# Patient Record
Sex: FEMALE | Race: BLACK OR AFRICAN AMERICAN | Employment: FULL TIME | URBAN - METROPOLITAN AREA
[De-identification: names, ages, dates, MRNs, and addresses within clinical notes are randomized per-mention and may not be internally consistent; named-entity substitution may affect disease eponyms.]

---

## 2022-05-10 ENCOUNTER — APPOINTMENT (OUTPATIENT)
Dept: GENERAL RADIOLOGY | Age: 27
End: 2022-05-10
Payer: MEDICAID

## 2022-05-10 ENCOUNTER — HOSPITAL ENCOUNTER (EMERGENCY)
Age: 27
Discharge: HOME | End: 2022-05-10
Attending: EMERGENCY MEDICINE
Payer: MEDICAID

## 2022-05-10 VITALS
SYSTOLIC BLOOD PRESSURE: 130 MMHG | HEART RATE: 92 BPM | DIASTOLIC BLOOD PRESSURE: 70 MMHG | OXYGEN SATURATION: 98 % | TEMPERATURE: 98.2 F | HEIGHT: 62 IN | BODY MASS INDEX: 24.29 KG/M2 | WEIGHT: 132 LBS | RESPIRATION RATE: 18 BRPM

## 2022-05-10 DIAGNOSIS — J40 BRONCHITIS: Primary | ICD-10-CM

## 2022-05-10 LAB
FLU A ANTIGEN: NEGATIVE
FLU B ANTIGEN: NEGATIVE
SARS-COV-2: NORMAL
SARS-COV-2: NOT DETECTED
SOURCE: NORMAL

## 2022-05-10 PROCEDURE — U0005 INFEC AGEN DETEC AMPLI PROBE: HCPCS

## 2022-05-10 PROCEDURE — 87804 INFLUENZA ASSAY W/OPTIC: CPT

## 2022-05-10 PROCEDURE — U0003 INFECTIOUS AGENT DETECTION BY NUCLEIC ACID (DNA OR RNA); SEVERE ACUTE RESPIRATORY SYNDROME CORONAVIRUS 2 (SARS-COV-2) (CORONAVIRUS DISEASE [COVID-19]), AMPLIFIED PROBE TECHNIQUE, MAKING USE OF HIGH THROUGHPUT TECHNOLOGIES AS DESCRIBED BY CMS-2020-01-R: HCPCS

## 2022-05-10 PROCEDURE — 71045 X-RAY EXAM CHEST 1 VIEW: CPT

## 2022-05-10 PROCEDURE — 99284 EMERGENCY DEPT VISIT MOD MDM: CPT

## 2022-05-10 RX ORDER — ALBUTEROL SULFATE 90 UG/1
2 AEROSOL, METERED RESPIRATORY (INHALATION) EVERY 6 HOURS PRN
Qty: 18 G | Refills: 3 | Status: SHIPPED | OUTPATIENT
Start: 2022-05-10 | End: 2022-05-30

## 2022-05-10 RX ORDER — PREDNISONE 10 MG/1
TABLET ORAL
Qty: 20 TABLET | Refills: 0 | Status: SHIPPED | OUTPATIENT
Start: 2022-05-10 | End: 2022-05-20

## 2022-05-10 RX ORDER — ALBUTEROL SULFATE 90 UG/1
2 AEROSOL, METERED RESPIRATORY (INHALATION) EVERY 6 HOURS PRN
COMMUNITY
End: 2022-05-30

## 2022-05-10 RX ORDER — BENZONATATE 100 MG/1
100 CAPSULE ORAL 3 TIMES DAILY PRN
Qty: 30 CAPSULE | Refills: 0 | Status: SHIPPED | OUTPATIENT
Start: 2022-05-10 | End: 2022-05-17

## 2022-05-10 ASSESSMENT — PAIN - FUNCTIONAL ASSESSMENT
PAIN_FUNCTIONAL_ASSESSMENT: NONE - DENIES PAIN
PAIN_FUNCTIONAL_ASSESSMENT: PREVENTS OR INTERFERES SOME ACTIVE ACTIVITIES AND ADLS
PAIN_FUNCTIONAL_ASSESSMENT: 0-10

## 2022-05-10 ASSESSMENT — PAIN DESCRIPTION - PAIN TYPE: TYPE: ACUTE PAIN

## 2022-05-10 ASSESSMENT — ENCOUNTER SYMPTOMS
SORE THROAT: 0
COUGH: 1
ABDOMINAL PAIN: 0
WHEEZING: 0
SHORTNESS OF BREATH: 0
DIARRHEA: 0

## 2022-05-10 ASSESSMENT — PAIN DESCRIPTION - ONSET: ONSET: GRADUAL

## 2022-05-10 ASSESSMENT — PAIN SCALES - GENERAL: PAINLEVEL_OUTOF10: 8

## 2022-05-10 ASSESSMENT — PAIN DESCRIPTION - FREQUENCY: FREQUENCY: INTERMITTENT

## 2022-05-10 ASSESSMENT — PAIN DESCRIPTION - LOCATION: LOCATION: CHEST

## 2022-05-10 ASSESSMENT — PAIN DESCRIPTION - DESCRIPTORS: DESCRIPTORS: SHARP

## 2022-05-10 NOTE — LETTER
[id number] [facility] ED  [address] [place] [address]  Phone: [phone]  Fax: [phone]               May 10, 2022    Patient: Kenedll Browne   YOB: 1995   Date of Visit: 5/10/2022       To Whom It May Concern:    Irish Gray was seen and treated in our emergency department on 5/10/2022. She may return to work 5/12/2022.        Sincerely,       Registered Nurse        Signature:__________________________________

## 2022-05-10 NOTE — ED NOTES
AVS reviewed with the patient. Discussed follow-up care, taking medications as prescribed, and when to call 911. VSS. All questions answered. Prescriptions printed and provided to patient. Ambulatory out of the department with a steady, unassisted gait.        Keila Alba RN  05/10/22 7251

## 2022-05-10 NOTE — ED PROVIDER NOTES
[id number] [facility] ED  [address] [facility] [address]  Phone: [phone]  Fax: [phone]        Pt Name: Emmanuel Vieira  MRN: [id number]  [place] 1995  Date of evaluation: 5/10/22      CHIEF COMPLAINT     Chief Complaint   Patient presents with    Cough    Fever         HISTORY OF PRESENT ILLNESS  (Location/Symptom, Timing/Onset, Context/Setting, Quality, Duration, Modifying Factors, Severity.)    Emmanuel Vieira is a 23 y.o. female who presents with chills cough congestion. The patient dates on Friday she started developing congestion cough that is occasionally productive of sputum some subjective fever chills no loss of smell or taste not knowingly exposed to anybody with similar symptoms no family members with similar symptoms nothing she does makes her symptoms better or worse no shortness of breath the patient dates she has had some occasional post cough emesis otherwise tolerating by mouth intake      REVIEW OF SYSTEMS    (2-9 systems for level 4, 10 or more for level 5)     Review of Systems   Constitutional: Positive for chills and fever. HENT: Positive for congestion. Negative for ear pain and sore throat. Respiratory: Positive for cough. Negative for shortness of breath and wheezing. Gastrointestinal: Negative for abdominal pain and diarrhea. PAST MEDICAL HISTORY    has a past medical history of Asthma and Thyroid disease. SURGICAL HISTORY      has no past surgical history on file. CURRENTMEDICATIONS       Previous Medications    ALBUTEROL SULFATE HFA (VENTOLIN HFA) 108 (90 BASE) MCG/ACT INHALER    Inhale 2 puffs into the lungs every 6 hours as needed for Wheezing       ALLERGIES     has no allergies on file. FAMILY HISTORY     has no family status information on file. Family history is unknown by patient. SOCIAL HISTORY      reports that she has never smoked. She has never used smokeless tobacco. She reports previous alcohol use.  She reports that she does not use drugs. PHYSICAL EXAM    (up to 7 for level 4, 8 or more for level 5)   INITIAL VITALS:  height is 5' 2\" (1.575 m) and weight is 59.9 kg (132 lb). Her oral temperature is 98.2 °F (36.8 °C). Her blood pressure is 132/74 and her pulse is 94. Her respiration is 18 and oxygen saturation is 99%. Physical Exam  Vitals and nursing note reviewed. Constitutional:       Appearance: Normal appearance. HENT:      Head: Normocephalic and atraumatic. Right Ear: Tympanic membrane normal.      Left Ear: Tympanic membrane normal.      Mouth/Throat:      Mouth: Mucous membranes are moist.      Pharynx: Oropharynx is clear. Eyes:      Conjunctiva/sclera: Conjunctivae normal.   Cardiovascular:      Rate and Rhythm: Normal rate and regular rhythm. Pulses: Normal pulses. Heart sounds: Normal heart sounds. Pulmonary:      Effort: Pulmonary effort is normal. No respiratory distress. Breath sounds: Normal breath sounds. No stridor. No wheezing, rhonchi or rales. Abdominal:      General: There is no distension. Palpations: Abdomen is soft. Tenderness: There is no abdominal tenderness. There is no guarding. Musculoskeletal:         General: No swelling or tenderness. Normal range of motion. Cervical back: Normal range of motion and neck supple. No rigidity or tenderness. Lymphadenopathy:      Cervical: No cervical adenopathy. Skin:     General: Skin is warm and dry. Findings: No rash. Neurological:      General: No focal deficit present. Mental Status: She is alert.          DIFFERENTIAL DIAGNOSIS/ MDM:     I will get a chest x-ray as well as check for influenza and COVID    DIAGNOSTIC RESULTS         RADIOLOGY:        Interpretation per the Radiologist below, if available at the time of this note:    XR CHEST PORTABLE (Final result)  Result time 05/10/22 07:52:49  Final result by Sylvie Lockhart MD (05/10/22 07:52:49)                Impression: No radiographic evidence of an acute cardiopulmonary process. Narrative:    EXAMINATION:   ONE XRAY VIEW OF THE CHEST     5/10/2022 7:40 am     COMPARISON:   None. HISTORY:   ORDERING SYSTEM PROVIDED HISTORY: cough   TECHNOLOGIST PROVIDED HISTORY:   cough   Reason for Exam: Pt state she has had upper left chest pain, cough and some   SOB for several days.  Ap upr on cart     FINDINGS:   Cardiomediastinal silhouette appears within normal limits.  No focal   consolidation or overt pulmonary edema.  Costophrenic angles are sharp.  No   evidence of pneumothorax.  No acute osseous abnormalities. LABS:  Results for orders placed or performed during the hospital encounter of 05/10/22   Rapid influenza A/B antigens    Specimen: Nares   Result Value Ref Range    Flu A Antigen NEGATIVE NEGATIVE    Flu B Antigen NEGATIVE NEGATIVE           EMERGENCY DEPARTMENT COURSE:   Vitals:    Vitals:    05/10/22 0659 05/10/22 0700   BP:  132/74   Pulse: 94    Resp: 18    Temp: 98.2 °F (36.8 °C)    TempSrc: Oral    SpO2: 99%    Weight: 59.9 kg (132 lb)    Height: 5' 2\" (1.575 m)      -------------------------  BP: 132/74, Temp: 98.2 °F (36.8 °C), Pulse: 94, Resp: 18      RE-EVALUATION:  X-ray shows no acute process the patient is not tachycardic she is not hypoxic she has been tolerating by mouth intake given this I do feel able to be followed up as an outpatient I will write prescriptions for prednisone albuterol and Tessalon Perles  The patient presents with viral symptoms that may represent COVID-19. However at this time, all vital signs are stable and the patient is not hypoxic or tachypneic. Presenting  symptoms are currently mild and not life, limb or organ threatening. The patient presents with upper respiratory symptoms that are not suggestive in nature of pulmonary embolus, cardiac ischemia, meningitis, epiglottis, airway obstruction or other serious etiology.  Given the extremely low risk of these diagnoses further testing and evaluation for these possibilites are not indicated at this time. In my opinion, the patient currently does not have an emergency medical condition as defined by EMTALA. I have stressed to the patient that while their current symptoms may not be severe to them, they are highly contagious and should self quarantine for at least 10 days from the onset of symptoms or 72 hours after resolution of their fever without any antipyretic medications (whichever is shorter). Isolation strategies have been discussed and reinforced to protect their friends and families. I have answered questions asked to the patients satisfaction. They have been instructed to immediately return to the emergency department if symptoms worsen including but not limited to shortness of breath, chest pain, a feeling of passing out,light headed or dizziness, any neurologic symptoms, abdominal pain, or persistent nausea, vomiting and/or diarrhea. I have stressed the importance of close followup with their primary care physicians via telemedicine. The patient has voiced understanding of these instructions and does not offer any further questions or complaints. The patient understands that at this time there is no evidence for a more malignant underlying process, but the patient also understands that early in the process of an illness or injury, an emergency department workup can be falsely reassuring. Routine discharge counseling was given, and the patient understands that worsening, changing or persistent symptoms should prompt an immediate call or follow up with their primary physician or return to the emergency department. The importance of appropriate follow up was also discussed. I have reviewed the disposition diagnosis with the patient and or their family/guardian. I have answered their questions and given discharge instructions.   They voiced understanding of these instructions and did not have any further questions or complaints. PROCEDURES:  None    FINAL IMPRESSION      1. Bronchitis          DISPOSITION/PLAN   DISPOSITION Decision To Discharge 05/10/2022 08:01:08 AM      CONDITION ON DISPOSITION:   Stable    PATIENT REFERRED TO:    Your Family Doctor  Call in 2 days        DISCHARGE MEDICATIONS:  New Prescriptions    ALBUTEROL SULFATE HFA (VENTOLIN HFA) 108 (90 BASE) MCG/ACT INHALER    Inhale 2 puffs into the lungs every 6 hours as needed for Wheezing    BENZONATATE (TESSALON PERLES) 100 MG CAPSULE    Take 1 capsule by mouth 3 times daily as needed for Cough    PREDNISONE (DELTASONE) 10 MG TABLET    Take 4 tablets by mouth once daily for 5 days       (Please note that portions of this note were completed with a voicerecognition program.  Efforts were made to edit the dictations but occasionally words are mis-transcribed.)    Puneet Castro MD,, MD, F.A.C.E.P.   Attending Emergency Medicine Physician       Puneet Castro MD  05/10/22 2027

## 2022-05-30 ENCOUNTER — HOSPITAL ENCOUNTER (EMERGENCY)
Age: 27
Discharge: HOME | End: 2022-05-30
Attending: EMERGENCY MEDICINE
Payer: MEDICAID

## 2022-05-30 VITALS
HEART RATE: 86 BPM | OXYGEN SATURATION: 99 % | WEIGHT: 125 LBS | RESPIRATION RATE: 16 BRPM | HEIGHT: 62 IN | DIASTOLIC BLOOD PRESSURE: 70 MMHG | SYSTOLIC BLOOD PRESSURE: 125 MMHG | BODY MASS INDEX: 23 KG/M2 | TEMPERATURE: 98.6 F

## 2022-05-30 DIAGNOSIS — R00.2 PALPITATIONS: Primary | ICD-10-CM

## 2022-05-30 LAB
ANION GAP SERPL CALCULATED.3IONS-SCNC: 9 MMOL/L (ref 9–17)
BUN BLDV-MCNC: 9 MG/DL (ref 6–20)
CALCIUM SERPL-MCNC: 9.1 MG/DL (ref 8.6–10.4)
CHLORIDE BLD-SCNC: 106 MMOL/L (ref 98–107)
CO2: 23 MMOL/L (ref 20–31)
CREAT SERPL-MCNC: 0.43 MG/DL (ref 0.5–0.9)
GFR AFRICAN AMERICAN: >60 ML/MIN
GFR NON-AFRICAN AMERICAN: >60 ML/MIN
GFR SERPL CREATININE-BSD FRML MDRD: ABNORMAL ML/MIN/{1.73_M2}
GLUCOSE BLD-MCNC: 79 MG/DL (ref 70–99)
POTASSIUM SERPL-SCNC: 3.6 MMOL/L (ref 3.7–5.3)
SODIUM BLD-SCNC: 138 MMOL/L (ref 135–144)
TSH SERPL DL<=0.05 MIU/L-ACNC: <0.01 UIU/ML (ref 0.3–5)

## 2022-05-30 PROCEDURE — 99284 EMERGENCY DEPT VISIT MOD MDM: CPT

## 2022-05-30 PROCEDURE — 84443 ASSAY THYROID STIM HORMONE: CPT

## 2022-05-30 PROCEDURE — 84439 ASSAY OF FREE THYROXINE: CPT

## 2022-05-30 PROCEDURE — 80048 BASIC METABOLIC PNL TOTAL CA: CPT

## 2022-05-30 PROCEDURE — 93005 ELECTROCARDIOGRAM TRACING: CPT | Performed by: EMERGENCY MEDICINE

## 2022-05-30 ASSESSMENT — ENCOUNTER SYMPTOMS
ALLERGIC/IMMUNOLOGIC NEGATIVE: 1
EYES NEGATIVE: 1
GASTROINTESTINAL NEGATIVE: 1
RESPIRATORY NEGATIVE: 1

## 2022-05-30 ASSESSMENT — PAIN - FUNCTIONAL ASSESSMENT: PAIN_FUNCTIONAL_ASSESSMENT: NONE - DENIES PAIN

## 2022-05-30 NOTE — ED TRIAGE NOTES
Patient arrived to ED room 1 with complaints of heart palpitations starting on Saturday. Patient stated that she has had this before when her thyroid levels were out of the ordinary. Patient respirations easy and unlabored. Breath sounds clear. Patient denies any pain. Patient is instructed on plan of care.

## 2022-05-30 NOTE — LETTER
[address] [facility]  [address] [place] [address]  Phone: [phone]  Fax: [phone]             May 30, 2022    Patient: Sirena Bae   YOB: 1995   Date of Visit: 5/30/2022       To Whom It May Concern:    [facility] was seen and treated in our emergency department on 5/30/2022. Please excuse her  Ronnie Levy on this day and return to work on 5/31/2023.        Sincerely,             Signature:__________________________________

## 2022-05-30 NOTE — LETTER
[address] [facility] ED  [address] [facility] [address]  Phone: [phone]  Fax: [phone]               May 30, 2022    Patient: Koffi Moy   YOB: 1995   Date of Visit: 5/30/2022       To Whom It May Concern:    Kendra Gray was seen and treated in our emergency department on 5/30/2022. She may return to work on 05/31/2023.       Sincerely,       Conchita Hammans, RN         Signature:__________________________________

## 2022-05-30 NOTE — ED PROVIDER NOTES
eMERGENCY dEPARTMENT eNCOUnter      Pt Name: Sirena Bae  MRN: [id number]  [place] 1995  Date of evaluation: 5/30/2022      CHIEF COMPLAINT       Chief Complaint   Patient presents with    Palpitations          HISTORY OF PRESENT ILLNESS    Sirena Bae is a 23 y.o. female who presents emergency department with complaints of heart palpitations that started a couple of days ago. Patient she says this is happened before she is a thyroid patient she says has not been on thyroid medications raised recently. She is asymptomatic at this point in time with a regular heartbeat around 86 bpm.  Denies chest pain nausea vomiting diaphoresis shortness of breath. REVIEW OF SYSTEMS     Review of Systems   Constitutional: Negative. HENT: Negative. Eyes: Negative. Respiratory: Negative. Cardiovascular: Positive for palpitations. Gastrointestinal: Negative. Endocrine: Negative. Genitourinary: Negative. Musculoskeletal: Negative. Skin: Negative. Allergic/Immunologic: Negative. Neurological: Negative. Hematological: Negative. Psychiatric/Behavioral: Negative. PAST MEDICAL HISTORY    has a past medical history of Asthma and Thyroid disease. SURGICAL HISTORY      has no past surgical history on file. CURRENT MEDICATIONS       Previous Medications    No medications on file       ALLERGIES     has No Known Allergies. FAMILY HISTORY     has no family status information on file. Family history is unknown by patient. SOCIAL HISTORY      reports that she has never smoked. She has never used smokeless tobacco. She reports previous alcohol use. She reports that she does not use drugs. PHYSICAL EXAM     INITIAL VITALS:  height is 5' 2\" (1.575 m) and weight is 56.7 kg (125 lb). Her oral temperature is 98.6 °F (37 °C). Her blood pressure is 125/70 and her pulse is 86. Her respiration is 16 and oxygen saturation is 99%.      Constitutional: Alert, oriented x3, nontoxic, afebrile, answering questions appropriately, acting properly for age, in no acute distress  HEENT: Extraocular muscles intact, mucus membranes moist, TMs clear bilaterally, no posterior pharyngeal erythema or exudates, Pupils equal, round, reactive to light,   Neck: Trachea midline, Supple without lymphadenopathy, no posterior midline neck tenderness to palpation  Cardiovascular: Regular rhythm and rate no S3, S4, or murmurs  Respiratory: Clear to auscultation bilaterally no wheezes, rhonchi, rales, no respiratory distress  Gastrointestinal: Soft, nontender, nondistended, positive bowel sounds. No rebound, rigidity, or guarding. Musculoskeletal: No extremity pain or swelling  Neurologic: Moving all 4 extremities without difficulty there are no gross focal neurologic deficits  Skin: Warm and dry      DIFFERENTIAL DIAGNOSIS/ MDM:   Palpitations of uncertain etiology possibly related to thyroid disease possibly related to hyperadrenalism possibly related to intrinsic cardiac problems. Patient will get an EKG and a set of  electrolytes she is going to need a Holter monitor and possibly an echocardiogram.    DIAGNOSTIC RESULTS     EKG: All EKG's are interpreted by the Emergency Department Physician who either signs or Co-signs this chart in the absence of a cardiologist.    EG is a sinus rhythm at 88 bpm, IL interval is 0.138, QRS duration is 0.084, QTc is 434 ms no obvious ST or T wave changes indicative any ischemia on this EKG and it is a regular rhythm with no ectopy.     Not indicated unless otherwise documented above    LABS:  Results for orders placed or performed during the hospital encounter of 20/84/98   Basic Metabolic Panel   Result Value Ref Range    Glucose 79 70 - 99 mg/dL    BUN 9 6 - 20 mg/dL    CREATININE 0.43 (L) 0.50 - 0.90 mg/dL    Calcium 9.1 8.6 - 10.4 mg/dL    Sodium 138 135 - 144 mmol/L    Potassium 3.6 (L) 3.7 - 5.3 mmol/L    Chloride 106 98 - 107 mmol/L    CO2 23 20 - 31 mmol/L Anion Gap 9 9 - 17 mmol/L    GFR Non-African American >60 >60 mL/min    GFR African American >60 >60 mL/min    GFR Comment         TSH with Reflex   Result Value Ref Range    TSH <0.01 (L) 0.30 - 5.00 uIU/mL       Not indicated unless otherwise documented above    RADIOLOGY:   I reviewed the radiologist interpretations:  No orders to display       Not indicated unless otherwise documented above    EMERGENCY DEPARTMENT COURSE:     The patient was given the following medications:  No orders of the defined types were placed in this encounter. Vitals:    Vitals:    05/30/22 1943 05/30/22 1945   BP:  125/70   Pulse: 86    Resp: 16    Temp: 98.6 °F (37 °C)    TempSrc: Oral    SpO2: 99%    Weight: 56.7 kg (125 lb)    Height: 5' 2\" (1.575 m)      -------------------------  /70   Pulse 86   Temp 98.6 °F (37 °C) (Oral)   Resp 16   Ht 5' 2\" (1.575 m)   Wt 56.7 kg (125 lb)   SpO2 99%   BMI 22.86 kg/m²         I have reviewed the disposition diagnosis with the patient and or their family/guardian. I have answered their questions and given discharge instructions. They voiced understanding of these instructions and did not have any further questions or complaints. CRITICAL CARE:    None    CONSULTS:    None    PROCEDURES:    None      OARRS Report if indicated             FINAL IMPRESSION      1. Palpitations          DISPOSITION/PLAN   DISPOSITION Decision To Discharge    I have reviewed the disposition diagnosis with the patient and or their family/guardian. I have answered their questions and given discharge instructions. They voiced understanding of these instructions and did not have any further questions or complaints. Reevaluation: Patient's electrolytes are within normal limits. The TSH is slightly low T4 is a send out so will not can make her wait for that.   Patient can be discharged home we are recommending stopping caffeine getting 8 hours of sleep starting magnesium and following up with a cardiologist for an outpatient Holter monitor if possible. PATIENT REFERRED TO:    Doctor or family doctor within a couple of days.   In 2 days        DISCHARGE MEDICATIONS:  New Prescriptions    No medications on file       (Please note that portions of this note were completed with a voice recognition program.  Efforts were made to edit the dictations but occasionally words are mis-transcribed.)    Zbigniew Londono MD,, MD  Attending Emergency Physician            Zbigniew Londono MD  05/30/22 8019

## 2022-05-31 LAB — THYROXINE, FREE: 3.98 NG/DL (ref 0.93–1.7)

## 2022-06-01 LAB
EKG ATRIAL RATE: 88 BPM
EKG P AXIS: 18 DEGREES
EKG P-R INTERVAL: 138 MS
EKG Q-T INTERVAL: 362 MS
EKG QRS DURATION: 84 MS
EKG QTC CALCULATION (BAZETT): 438 MS
EKG R AXIS: 57 DEGREES
EKG T AXIS: 41 DEGREES
EKG VENTRICULAR RATE: 88 BPM

## 2023-01-25 ENCOUNTER — HOSPITAL ENCOUNTER (EMERGENCY)
Age: 28
Discharge: HOME | End: 2023-01-26
Attending: EMERGENCY MEDICINE
Payer: MEDICAID

## 2023-01-25 VITALS
WEIGHT: 140 LBS | RESPIRATION RATE: 20 BRPM | TEMPERATURE: 98.6 F | OXYGEN SATURATION: 98 % | BODY MASS INDEX: 25.76 KG/M2 | HEART RATE: 93 BPM | DIASTOLIC BLOOD PRESSURE: 68 MMHG | HEIGHT: 62 IN | SYSTOLIC BLOOD PRESSURE: 133 MMHG

## 2023-01-25 DIAGNOSIS — R10.9 ABDOMINAL PAIN DURING INTRAUTERINE PREGNANCY: Primary | ICD-10-CM

## 2023-01-25 DIAGNOSIS — O26.899 ABDOMINAL PAIN DURING INTRAUTERINE PREGNANCY: Primary | ICD-10-CM

## 2023-01-25 LAB
ABSOLUTE EOS #: 0.06 K/UL (ref 0–0.44)
ABSOLUTE IMMATURE GRANULOCYTE: 0 K/UL (ref 0–0.3)
ABSOLUTE LYMPH #: 1.51 K/UL (ref 1.1–3.7)
ABSOLUTE MONO #: 0.5 K/UL (ref 0.1–1.2)
ALBUMIN SERPL-MCNC: 3.8 G/DL (ref 3.5–5.2)
ALBUMIN/GLOBULIN RATIO: 1.2 (ref 1–2.5)
ALP BLD-CCNC: 75 U/L (ref 35–104)
ALT SERPL-CCNC: 11 U/L (ref 5–33)
ANION GAP SERPL CALCULATED.3IONS-SCNC: 11 MMOL/L (ref 9–17)
AST SERPL-CCNC: 13 U/L
BASOPHILS # BLD: 0 % (ref 0–2)
BASOPHILS ABSOLUTE: 0 K/UL (ref 0–0.2)
BILIRUB SERPL-MCNC: 0.2 MG/DL (ref 0.3–1.2)
BILIRUBIN URINE: NEGATIVE
BUN BLDV-MCNC: 5 MG/DL (ref 6–20)
CALCIUM SERPL-MCNC: 8.6 MG/DL (ref 8.6–10.4)
CANDIDA SPECIES, DNA PROBE: NEGATIVE
CHLORIDE BLD-SCNC: 103 MMOL/L (ref 98–107)
CO2: 19 MMOL/L (ref 20–31)
COLOR: YELLOW
CREAT SERPL-MCNC: 0.36 MG/DL (ref 0.5–0.9)
EOSINOPHILS RELATIVE PERCENT: 1 % (ref 1–4)
EPITHELIAL CELLS UA: NORMAL /HPF (ref 0–5)
GARDNERELLA VAGINALIS, DNA PROBE: NEGATIVE
GFR SERPL CREATININE-BSD FRML MDRD: >60 ML/MIN/1.73M2
GLUCOSE BLD-MCNC: 99 MG/DL (ref 70–99)
GLUCOSE URINE: NEGATIVE
HCG QUANTITATIVE: ABNORMAL MIU/ML
HCT VFR BLD CALC: 33.1 % (ref 36.3–47.1)
HEMOGLOBIN: 10.4 G/DL (ref 11.9–15.1)
IMMATURE GRANULOCYTES: 0 %
KETONES, URINE: NEGATIVE
LEUKOCYTE ESTERASE, URINE: NEGATIVE
LYMPHOCYTES # BLD: 24 % (ref 24–43)
MAGNESIUM: 1.7 MG/DL (ref 1.6–2.6)
MCH RBC QN AUTO: 20.9 PG (ref 25.2–33.5)
MCHC RBC AUTO-ENTMCNC: 31.4 G/DL (ref 28.4–34.8)
MCV RBC AUTO: 66.5 FL (ref 82.6–102.9)
MONOCYTES # BLD: 8 % (ref 3–12)
MORPHOLOGY: ABNORMAL
MORPHOLOGY: ABNORMAL
NITRITE, URINE: NEGATIVE
NRBC AUTOMATED: 0 PER 100 WBC
PDW BLD-RTO: 15.5 % (ref 11.8–14.4)
PH UA: 6 (ref 5–8)
PLATELET # BLD: 286 K/UL (ref 138–453)
PMV BLD AUTO: 9.4 FL (ref 8.1–13.5)
POTASSIUM SERPL-SCNC: 3.3 MMOL/L (ref 3.7–5.3)
PROTEIN UA: NEGATIVE
RBC # BLD: 4.98 M/UL (ref 3.95–5.11)
RBC UA: NORMAL /HPF (ref 0–4)
SEG NEUTROPHILS: 67 % (ref 36–65)
SEGMENTED NEUTROPHILS ABSOLUTE COUNT: 4.23 K/UL (ref 1.5–8.1)
SODIUM BLD-SCNC: 133 MMOL/L (ref 135–144)
SOURCE: NORMAL
SPECIFIC GRAVITY UA: 1.02 (ref 1–1.03)
TOTAL PROTEIN: 7 G/DL (ref 6.4–8.3)
TRICHOMONAS VAGINALIS DNA: NEGATIVE
TURBIDITY: CLEAR
URINE HGB: NEGATIVE
UROBILINOGEN, URINE: NORMAL
WBC # BLD: 6.3 K/UL (ref 3.5–11.3)
WBC UA: NORMAL /HPF (ref 0–5)

## 2023-01-25 PROCEDURE — 84702 CHORIONIC GONADOTROPIN TEST: CPT

## 2023-01-25 PROCEDURE — 87086 URINE CULTURE/COLONY COUNT: CPT

## 2023-01-25 PROCEDURE — 87591 N.GONORRHOEAE DNA AMP PROB: CPT

## 2023-01-25 PROCEDURE — 87660 TRICHOMONAS VAGIN DIR PROBE: CPT

## 2023-01-25 PROCEDURE — 99283 EMERGENCY DEPT VISIT LOW MDM: CPT

## 2023-01-25 PROCEDURE — 87491 CHLMYD TRACH DNA AMP PROBE: CPT

## 2023-01-25 PROCEDURE — 6370000000 HC RX 637 (ALT 250 FOR IP)

## 2023-01-25 PROCEDURE — 87480 CANDIDA DNA DIR PROBE: CPT

## 2023-01-25 PROCEDURE — 85025 COMPLETE CBC W/AUTO DIFF WBC: CPT

## 2023-01-25 PROCEDURE — 83735 ASSAY OF MAGNESIUM: CPT

## 2023-01-25 PROCEDURE — 87510 GARDNER VAG DNA DIR PROBE: CPT

## 2023-01-25 PROCEDURE — 80053 COMPREHEN METABOLIC PANEL: CPT

## 2023-01-25 PROCEDURE — 81001 URINALYSIS AUTO W/SCOPE: CPT

## 2023-01-25 RX ORDER — ACETAMINOPHEN 325 MG/1
650 TABLET ORAL ONCE
Status: COMPLETED | OUTPATIENT
Start: 2023-01-25 | End: 2023-01-25

## 2023-01-25 RX ADMIN — ACETAMINOPHEN 650 MG: 325 TABLET ORAL at 23:16

## 2023-01-25 ASSESSMENT — PAIN - FUNCTIONAL ASSESSMENT: PAIN_FUNCTIONAL_ASSESSMENT: 0-10

## 2023-01-25 ASSESSMENT — PAIN SCALES - GENERAL: PAINLEVEL_OUTOF10: 7

## 2023-01-26 LAB
CULTURE: NORMAL
SPECIMEN DESCRIPTION: NORMAL

## 2023-01-26 PROCEDURE — 6370000000 HC RX 637 (ALT 250 FOR IP)

## 2023-01-26 RX ADMIN — POTASSIUM BICARBONATE 40 MEQ: 782 TABLET, EFFERVESCENT ORAL at 00:27

## 2023-01-26 NOTE — ED NOTES
Report received from [facility], all questions answered. Pt resting in bed comfortably, NAD noted.  Will continue to monitor     Fabiola Medrano RN  01/26/23 0003

## 2023-01-26 NOTE — DISCHARGE INSTRUCTIONS
You were seen and evaluated in the emergency department for abdominal pain and pregnancy. Your ultrasound showed a single intrauterine pregnancy with good fetal movement. Your labs were all relatively normal and showed evidence of good progression in pregnancy. Your pain is most likely associated with normal pain in pregnancy, however since you have recently had a yeast infection that was not treated, you should monitor yourself for symptoms of uterine infection. If you begin to experience worsening pain, fever, discharge, racing heart, or significant lightheadedness, you should return to the emergency department for further evaluation. You should plan to follow-up with your OB as soon as possible for official dating ultrasound and management of your hyperthyroid.

## 2023-01-26 NOTE — ED PROVIDER NOTES
[address] [facility]     Emergency Department     Faculty Attestation        I performed a history and physical examination of the patient and discussed management with the resident. I reviewed the residents note and agree with the documented findings and plan of care. Any areas of disagreement are noted on the chart. I was personally present for the key portions of any procedures. I have documented in the chart those procedures where I was not present during the key portions. I have reviewed the emergency nurses triage note. I agree with the chief complaint, past medical history, past surgical history, allergies, medications, social and family history as documented unless otherwise noted below. For mid-level providers such as nurse practitioners as well as physicians assistants:    I have personally seen and evaluated the patient. I find the patient's history and physical exam are consistent with NP/PA documentation. I agree with the care provided, treatment rendered, disposition, & follow-up plan. Additional findings are as noted. Vital Signs: /68   Pulse 93   Temp 98.6 °F (37 °C) (Oral)   Resp 20   Ht 5' 2\" (1.575 m)   Wt 140 lb (63.5 kg)   LMP 04/25/2022   SpO2 98%   BMI 25.61 kg/m²   PCP:  No primary care provider on file. Pertinent Comments:     Patient thinks she is about 10 weeks pregnant has not had ultrasound yet complains of suprapubic abdominal pain with some mild difficulty urinating. No vaginal bleeding vaginal discharge he is afebrile nontoxix.   Will check labs, quantitative hCG, bedside ultrasound pelvic exam urinalysis      Critical Care  None          Osbaldo Ram MD    Attending Emergency Medicine Physician            Matilda Roque MD  01/25/23 6693

## 2023-01-26 NOTE — ED NOTES
Pt to ED via EMS a/o x4 with c/o pelvic pain. Pt reports being approx 16 weeks pregnant. Pt denies any current vaginal bleeding or vaginal discharge. Pt stated she has had one live birth and this is her 2nd pregnancy. Pt reports she is having some nausea, or denies any chest pain or SOB. Pt stated she sees PB at [facility]. Pt stated she recently moved here from [place]. Pt stated she did have some vaginal bleeding a few weeks ago but never received an ultrasound.  Pt VSS< call light is in reach      ROSEMARY Welch RN  01/25/23 7206

## 2023-01-26 NOTE — ED PROVIDER NOTES
[facility]  ED  Emergency Department Encounter  Emergency Medicine Resident     Pt Name:Verena Gray  MRN: [id number]  [place] 1995  Date of evaluation: 1/25/23  PCP:  No primary care provider on file. Note Started: 10:05 PM EST      CHIEF COMPLAINT       Chief Complaint   Patient presents with    Pelvic Pain     Pt reports being 16 weeks pregnant, pt denies any current vaginal bleeding or leaking of fluid        HISTORY OF PRESENT ILLNESS  (Location/Symptom, Timing/Onset, Context/Setting, Quality, Duration, Modifying Factors, Severity.)      Julia Gray is a 33 y.o. female G2, P1 at approximately 12 weeks who presents with pelvic pain for the past 2 days. Pain is intermittent, sharp and not specifically associated with nausea or vomiting. Patient does have some pain and burning when she urinates but denies any blood. Patient says she has some white mucousy discharge but denies any vaginal bleeding. She denies any shortness of breath or chest pain. Denies fever or chills. Patient is recently moved up here from [place] and has just established care with an OB in early January. She was hospitalized after some vaginal bleeding for concern of thyrotoxicosis due to significantly elevated T4 and TSH of 0. She said this is new for her during this pregnancy and is still in the process of being worked up by her OB. She was told by her OB to come in after calling them today due to pelvic pain. Of note patient does have a new heart murmur during this pregnancy for which she has an appointment to see cardiology 4. PAST MEDICAL / SURGICAL / SOCIAL / FAMILY HISTORY      has a past medical history of Asthma and Thyroid disease.     No significant surgical history    Social History     Socioeconomic History    Marital status: Single     Spouse name: Not on file    Number of children: Not on file    Years of education: Not on file    Highest education level: Not on file   Occupational History    Not on file   Tobacco Use    Smoking status: Never    Smokeless tobacco: Never   Vaping Use    Vaping Use: Never used   Substance and Sexual Activity    Alcohol use: Not Currently    Drug use: Never    Sexual activity: Not on file   Other Topics Concern    Not on file   Social History Narrative    Not on file     Social Determinants of Health     Financial Resource Strain: Not on file   Food Insecurity: Not on file   Transportation Needs: Not on file   Physical Activity: Not on file   Stress: Not on file   Social Connections: Not on file   Intimate Partner Violence: Not on file   Housing Stability: Not on file       Family History   Family history unknown: Yes       Allergies:  Patient has no known allergies. Home Medications:  Prior to Admission medications    Not on File       REVIEW OF SYSTEMS       As stated in HPI    PHYSICAL EXAM      INITIAL VITALS:   /68   Pulse 93   Temp 98.6 °F (37 °C) (Oral)   Resp 20   Ht 5' 2\" (1.575 m)   Wt 140 lb (63.5 kg)   LMP 04/25/2022   SpO2 98%   BMI 25.61 kg/m²     Physical Exam  Vitals reviewed. Constitutional:       Appearance: Normal appearance. HENT:      Mouth/Throat:      Mouth: Mucous membranes are moist.      Pharynx: Oropharynx is clear. Comments: Thyroid not swollen, nontender, no palpable mass  Eyes:      Extraocular Movements: Extraocular movements intact. Pupils: Pupils are equal, round, and reactive to light. Comments: Mild bilateral proptosis, vision grossly normal   Neck:      Comments: Thyroid not swollen, nontender, no palpable mass    Cardiovascular:      Rate and Rhythm: Normal rate and regular rhythm. Pulses: Normal pulses. Heart sounds: Murmur (Midsystolic 3+ murmur best heard at left sternal border) heard. Pulmonary:      Effort: Pulmonary effort is normal.      Breath sounds: Normal breath sounds. Abdominal:      Tenderness:  There is no right CVA tenderness, left CVA tenderness, guarding or rebound. Comments: Mildly gravid abdomen, reproducible pain with palpation to suprapubic area, no lateral tenderness   Genitourinary:     Comments: Pelvic exam: normal external genitalia, vulva, vagina, cervix, uterus and adnexa, VULVA: normal appearing vulva with no masses, tenderness or lesions, VAGINA: normal appearing vagina with normal color and discharge, no lesions, CERVIX: erythematous around cervical os, cervical discharge present - white  Os appeared open without blood or fetal parts present    Musculoskeletal:      Cervical back: Normal range of motion and neck supple. Neurological:      Mental Status: She is alert. DDX/DIAGNOSTIC RESULTS / EMERGENCY DEPARTMENT COURSE / MDM     Medical Decision Making  81-year-old female, G2, P1 at approximately 12 weeks based on LMP, coming in with 2 days of intermittent sharp pelvic pain not associated with nausea or vomiting. She just moved up from [place] and has only recently established with OB. Labs showed Candida infection which has not yet been communicated with her and she has not been treated for this yet. She is having some pain and burning with urination but otherwise well-appearing and afebrile. Did have some bleeding a couple weeks ago but this has resolved and she has had no additional bleeding. Exam notable for midline uterine tenderness consistent with sharp pain she is having. Pelvic exam notable for possible open os with surrounding hyperemia and milky discharge. Most likely untreated vaginal infection, however due to uterine tenderness and cervical findings concern that infection has progressed to endometritis, however patient is afebrile. We will plan to get basic labs, serum hCG quantitative, UA, urine culture, GC chlamydia, vaginitis probe, and perform abdominal ultrasound to check for IUP, fetal heart tones and movement. Amount and/or Complexity of Data Reviewed  External Data Reviewed: labs and notes.      Details: OB notes and labs reviewed  Labs: ordered. Risk  OTC drugs. Prescription drug management. EMERGENCY DEPARTMENT COURSE:    ED Course as of 01/26/23 0731   Wed Jan 25, 2023   2308 FETAL ULTRASOUND (INTRAUTERINE PREGNANCY):    A limited, bedside pelvic ultrasound was performed using a transabdominal probe. The medical necessity was to evaluate for signs of fetal activity, heart tones and distress. The structures studied were the uterus and its contents. FINDINGS:  Fetus was visualized. Significant fetal movement was visualized. Unable to obtain fetal heart tones as baby was moving too much. CRL consistent with 10-week 5-day gestation    The study was technically adequate. IMAGES:  Electronically uploaded to the PACS system [DH]   2000 Urinalysis shows no evidence of UTI. hCG 1 36,000 appropriately increased from prior 10 4000. CMP shows normal kidney function, hypokalemia of 3.3 which was known on previous labs. We will plan to replete this. CBC shows no significant evidence of infection and mild anemia 10.4 down from 11.4 less than a month ago which was stable from a month prior to that. Patient did endorse some vaginal bleeding a couple weeks ago which may have contributed to this. [DH]      ED Course User Index  [DH] Shira Costa MD     FINAL IMPRESSION      1. Abdominal pain during intrauterine pregnancy          DISPOSITION / PLAN     DISPOSITION Decision To Discharge 01/26/2023 12:23:17 AM      PATIENT REFERRED TO:  OB/Gyn    Go on 1/31/2023      [facility]  [address] [place] [address]  [phone]  Go to   If symptoms worsen    DISCHARGE MEDICATIONS:  There are no discharge medications for this patient.       Shira Costa MD  Emergency Medicine Resident    (Please note that portions of thisnote were completed with a voice recognition program.  Efforts were made to edit the dictations but occasionally words are mis-transcribed.)        Shira Costa MD  Resident  01/26/23 [id number]

## 2023-01-26 NOTE — ED NOTES
Discharge instructions given. Verbalized understanding. All questions answered.   Patient getting dressed     Coit KAREN Escamilla  01/26/23 9200

## 2023-01-27 LAB
C TRACH DNA GENITAL QL NAA+PROBE: NEGATIVE
N. GONORRHOEAE DNA: NEGATIVE
SPECIMEN DESCRIPTION: NORMAL